# Patient Record
Sex: FEMALE | Race: WHITE
[De-identification: names, ages, dates, MRNs, and addresses within clinical notes are randomized per-mention and may not be internally consistent; named-entity substitution may affect disease eponyms.]

---

## 2017-11-15 ENCOUNTER — HOSPITAL ENCOUNTER (OUTPATIENT)
Dept: HOSPITAL 45 - C.LAB | Age: 57
Discharge: HOME | End: 2017-11-15
Attending: FAMILY MEDICINE
Payer: COMMERCIAL

## 2017-11-15 DIAGNOSIS — E78.00: Primary | ICD-10-CM

## 2017-11-15 LAB
ALBUMIN/GLOB SERPL: 1.4 {RATIO} (ref 0.9–2)
ALP SERPL-CCNC: 68 U/L (ref 45–117)
ALT SERPL-CCNC: 38 U/L (ref 12–78)
ANION GAP SERPL CALC-SCNC: 5 MMOL/L (ref 3–11)
AST SERPL-CCNC: 23 U/L (ref 15–37)
BASOPHILS # BLD: 0.04 K/UL (ref 0–0.2)
BASOPHILS NFR BLD: 0.9 %
BUN SERPL-MCNC: 21 MG/DL (ref 7–18)
BUN/CREAT SERPL: 21.7 (ref 10–20)
CALCIUM SERPL-MCNC: 9.4 MG/DL (ref 8.5–10.1)
CHLORIDE SERPL-SCNC: 103 MMOL/L (ref 98–107)
CHOLEST/HDLC SERPL: 3.7 {RATIO}
CO2 SERPL-SCNC: 32 MMOL/L (ref 21–32)
COMPLETE: YES
CREAT SERPL-MCNC: 0.97 MG/DL (ref 0.6–1.2)
EOSINOPHIL NFR BLD AUTO: 216 K/UL (ref 130–400)
EOSINOPHIL NFR BLD: 3.7 %
GLOBULIN SER-MCNC: 3.1 GM/DL (ref 2.5–4)
GLUCOSE SERPL-MCNC: 106 MG/DL (ref 70–99)
GLUCOSE UR QL: 48 MG/DL
HCT VFR BLD CALC: 42.1 % (ref 37–47)
KETONES UR QL STRIP: 110 MG/DL
LYMPHOCYTES # BLD: 0.95 K/UL (ref 1.2–3.4)
LYMPHOCYTES NFR BLD: 20.2 %
MCH RBC QN AUTO: 30.7 PG (ref 25–34)
MCHC RBC AUTO-ENTMCNC: 34.7 G/DL (ref 32–36)
MCV RBC AUTO: 88.4 FL (ref 80–100)
NEUTROPHILS NFR BLD AUTO: 54.1 %
NEUTS HYPERSEG BLD QL SMEAR: (no result)
NITRITE UR QL STRIP: 106 MG/DL (ref 0–150)
PH UR: 179 MG/DL (ref 0–200)
PMV BLD AUTO: 10.1 FL (ref 7.4–10.4)
POTASSIUM SERPL-SCNC: 4.1 MMOL/L (ref 3.5–5.1)
RBC # BLD AUTO: 4.76 M/UL (ref 4.2–5.4)
SODIUM SERPL-SCNC: 140 MMOL/L (ref 136–145)
TOXIC GRANULES BLD QL SMEAR: (no result)
TSH SERPL-ACNC: 2.11 UIU/ML (ref 0.3–4.5)
VARIANT LYM ABS #: 0.73 K/UL
VARIANT LYMPHOCYTE %: 15.6 %
VERY LOW DENSITY LIPOPROT CALC: 21 MG/DL
WBC # BLD AUTO: 4.69 K/UL (ref 4.8–10.8)

## 2017-11-21 ENCOUNTER — HOSPITAL ENCOUNTER (OUTPATIENT)
Dept: HOSPITAL 45 - C.LAB | Age: 57
Discharge: HOME | End: 2017-11-21
Attending: FAMILY MEDICINE
Payer: COMMERCIAL

## 2017-11-21 DIAGNOSIS — R73.9: Primary | ICD-10-CM

## 2017-11-21 LAB — EST. AVERAGE GLUCOSE BLD GHB EST-MCNC: 120 MG/DL

## 2017-11-27 ENCOUNTER — HOSPITAL ENCOUNTER (OUTPATIENT)
Dept: HOSPITAL 45 - C.PAPS | Age: 57
Discharge: HOME | End: 2017-11-27
Attending: PHYSICIAN ASSISTANT
Payer: COMMERCIAL

## 2017-11-27 DIAGNOSIS — Z12.4: Primary | ICD-10-CM

## 2017-12-26 ENCOUNTER — HOSPITAL ENCOUNTER (OUTPATIENT)
Dept: HOSPITAL 45 - C.MAMM | Age: 57
Discharge: HOME | End: 2017-12-26
Attending: FAMILY MEDICINE
Payer: COMMERCIAL

## 2017-12-26 DIAGNOSIS — Z12.31: Primary | ICD-10-CM

## 2017-12-27 NOTE — MAMMOGRAPHY REPORT
THIS REPORT HAS BEEN AMENDED.  

BILATERAL DIGITAL SCREENING MAMMOGRAM TOMOSYNTHESIS WITH CAD: 12/26/2017

CLINICAL HISTORY: Routine screening.  Patient has no complaints.  





TECHNIQUE:  Breast tomosynthesis in addition to standard 2D mammography was performed. Current study 
was also evaluated with a Computer Aided Detection (CAD) system.  



COMPARISON: No prior exams were available for comparison.   



BREAST COMPOSITION:  There are scattered areas of fibroglandular density in both breasts.  



FINDINGS:  No suspicious mass, architectural distortion or cluster of microcalcifications is seen.  



IMPRESSION:  ACR BI-RADS CATEGORY 1: NEGATIVE

There is no mammographic evidence of malignancy.  Prior outside mammograms are currently being reques
zay and if obtained they will be reviewed, compared to the current exam to assess for any more subtle
 changes, and an addendum will be made to this report.  Otherwise, a 1 year screening mammogram is re
commended.  



The patient will receive written notification of the results.  





Approximately 10% of breast cancers are not detected with mammography. A negative mammographic report
 should not delay biopsy if a clinically suggestive mass is present.



Carmen Hernandez M.D.          

ay/:12/26/2017 16:57:33  



Imaging Technologist: Angelica LOUIS(BRUNA)(M), [facility]

letter sent: Normal 1/2  

BI-RADS Code: ACR BI-RADS Category 1: Negative   





AMENDMENT: 1/4/2018   Carmen Hernandez M.D. 

Prior outside mammograms from [facility] dated 06/08/2009, 06/17/2010, 06/21/2011, 07/26/2
012, 12/30/2013, 12/31/2014, 01/05/2016 became available for review.  There has been no significant i
nterval change comparing to the prior outside mammograms.  No suspicious masses, asymmetries, areas o
f distortion or calcifications are seen.  Recommend routine screening in 1 year.



Amended BI-RADS: ACR BI-RADS Category 1: Negative 

letter sent: Normal 1/2

## 2018-05-23 ENCOUNTER — HOSPITAL ENCOUNTER (OUTPATIENT)
Dept: HOSPITAL 45 - C.RAD | Age: 58
Discharge: HOME | End: 2018-05-23
Attending: FAMILY MEDICINE
Payer: COMMERCIAL

## 2018-05-23 DIAGNOSIS — M25.551: Primary | ICD-10-CM

## 2018-05-23 NOTE — DIAGNOSTIC IMAGING REPORT
R HIP UNILATERAL 2 VIEWS



HISTORY:  57 years-old Female RIGHT HIP PAIN acute right hip pain



COMPARISON: None available



TECHNIQUE: 2 views of the right hip



FINDINGS: 

Minimal marginal spurring about the acetabulum with subchondral sclerosis. No

significant joint space narrowing or avascular necrosis. No acute fracture or

dislocation. Moderate degenerative changes about the pubic symphysis. Soft

tissues are unremarkable without opaque foreign body.



IMPRESSION: No acute fracture or dislocation. 







The above report was generated using voice recognition software. It may contain

grammatical, syntax or spelling errors.







Electronically signed by:  Deni Sahni M.D.

5/23/2018 10:45 AM



Dictated Date/Time:  5/23/2018 10:44 AM